# Patient Record
Sex: FEMALE | Race: WHITE | NOT HISPANIC OR LATINO | URBAN - METROPOLITAN AREA
[De-identification: names, ages, dates, MRNs, and addresses within clinical notes are randomized per-mention and may not be internally consistent; named-entity substitution may affect disease eponyms.]

---

## 2020-07-25 ENCOUNTER — TELEPHONE ENCOUNTER (OUTPATIENT)
Dept: URBAN - METROPOLITAN AREA CLINIC 13 | Facility: CLINIC | Age: 30
End: 2020-07-25

## 2020-07-26 ENCOUNTER — TELEPHONE ENCOUNTER (OUTPATIENT)
Dept: URBAN - METROPOLITAN AREA CLINIC 13 | Facility: CLINIC | Age: 30
End: 2020-07-26

## 2020-07-26 RX ORDER — LINACLOTIDE 72 UG/1
CAPSULE, GELATIN COATED ORAL
Qty: 90 | Refills: 0 | Status: ACTIVE | COMMUNITY
Start: 2019-08-07

## 2020-07-26 RX ORDER — BROMPHENIRAMINE MALEATE, PSEUDOEPHEDRINE HYDROCHLORIDE, 2; 30; 10 MG/5ML; MG/5ML; MG/5ML
TAKE 5 MLS BY MOUTH EVERY 4-6 HOURS SYRUP ORAL
Qty: 210 | Refills: 0 | Status: ACTIVE | COMMUNITY
Start: 2020-01-16

## 2020-07-26 RX ORDER — FLUTICASONE PROPIONATE 50 UG/1
INSTILL 2 SPRAYS IN EACH NOSTRIL DAILY SPRAY, METERED NASAL
Qty: 16 | Refills: 0 | Status: ACTIVE | COMMUNITY
Start: 2020-01-16

## 2020-07-26 RX ORDER — TIZANIDINE 2 MG/1
TAKE ONE TABLET BY MOUTH TWICE DAILY AS NEEDED FOR HEADACHE TABLET ORAL
Qty: 28 | Refills: 0 | Status: ACTIVE | COMMUNITY
Start: 2019-08-29

## 2020-07-26 RX ORDER — DICYCLOMINE HYDROCHLORIDE 20 MG/1
TABLET ORAL
Qty: 42 | Refills: 0 | Status: ACTIVE | COMMUNITY
Start: 2019-10-21

## 2020-07-26 RX ORDER — PROMETHAZINE HYDROCHLORIDE AND CODEINE PHOSPHATE 6.25; 1 MG/5ML; MG/5ML
SOLUTION ORAL
Qty: 70 | Refills: 0 | Status: ACTIVE | COMMUNITY
Start: 2019-02-14

## 2020-07-26 RX ORDER — NEOMYCIN SULFATE, POLYMYXIN B SULFATE AND DEXAMETHASONE 3.5; 10000; 1 MG/ML; [USP'U]/ML; MG/ML
SUSPENSION/ DROPS OPHTHALMIC
Qty: 5 | Refills: 0 | Status: ACTIVE | COMMUNITY
Start: 2019-10-14

## 2020-07-26 RX ORDER — CEPHALEXIN 500 MG/1
CAPSULE ORAL
Qty: 14 | Refills: 0 | Status: ACTIVE | COMMUNITY
Start: 2019-10-21

## 2020-07-26 RX ORDER — LINACLOTIDE 72 UG/1
TAKE ONE CAPSULE BY MOUTH DAILY CAPSULE, GELATIN COATED ORAL
Qty: 90 | Refills: 0 | Status: ACTIVE | COMMUNITY
Start: 2019-07-23

## 2020-07-26 RX ORDER — AZITHROMYCIN DIHYDRATE 250 MG/1
TABLET, FILM COATED ORAL
Qty: 6 | Refills: 0 | Status: ACTIVE | COMMUNITY
Start: 2019-02-14

## 2020-07-26 RX ORDER — AZITHROMYCIN DIHYDRATE 250 MG/1
TAKE 2 TABLETS BY MOUTH TODAY, THEN TAKE 1 TABLET DAILY FOR 4 DAYS TABLET, FILM COATED ORAL
Qty: 6 | Refills: 0 | Status: ACTIVE | COMMUNITY
Start: 2020-01-16

## 2020-07-26 RX ORDER — DOCUSATE SODIUM 100 MG/1
TAKE ONE CAPSULE BY MOUTH ONCE DAILY AS NEEDED CAPSULE, LIQUID FILLED ORAL
Qty: 30 | Refills: 0 | Status: ACTIVE | COMMUNITY
Start: 2020-03-05

## 2020-07-26 RX ORDER — CHLORHEXIDINE GLUCONATE 4 %
TAKE ONE TABLET BY MOUTH ONCE DAILY LIQUID (ML) TOPICAL
Qty: 30 | Refills: 0 | Status: ACTIVE | COMMUNITY
Start: 2020-03-05

## 2020-07-26 RX ORDER — CLONIDINE HYDROCHLORIDE 0.1 MG/1
TAKE 1 TABLET AT BEDTIME TABLET ORAL
Refills: 0 | Status: ACTIVE | COMMUNITY
Start: 2019-01-26

## 2020-07-26 RX ORDER — IBUPROFEN 600 MG/1
TAKE 1 TABLET BY MOUTH 3 TIMES DAILY TABLET ORAL
Qty: 15 | Refills: 0 | Status: ACTIVE | COMMUNITY
Start: 2019-02-14

## 2020-07-26 RX ORDER — IBUPROFEN 600 MG/1
TAKE 1 TABLET BY MOUTH 3 TIMES DAILY TABLET ORAL
Qty: 15 | Refills: 0 | Status: ACTIVE | COMMUNITY
Start: 2020-01-16

## 2020-07-26 RX ORDER — NEOMYCIN SULFATE, POLYMYXIN B SULFATE AND DEXAMETHASONE 3.5; 10000; 1 MG/ML; [USP'U]/ML; MG/ML
INSTILL 3 DROPS IN THE NOSE 3 TIMES DAILY SUSPENSION/ DROPS OPHTHALMIC
Qty: 5 | Refills: 0 | Status: ACTIVE | COMMUNITY
Start: 2019-02-14

## 2020-07-26 RX ORDER — CHLORDIAZEPOXIDE HYDROCHLORIDE AND CLIDINIUM BROMIDE 5; 2.5 MG/1; MG/1
TAKE 1 CAPSULE 3 TIMES DAILY PRN PAIN CAPSULE ORAL
Qty: 45 | Refills: 1 | Status: ACTIVE | COMMUNITY
Start: 2019-12-30

## 2023-01-12 ENCOUNTER — OFFICE VISIT (OUTPATIENT)
Dept: URBAN - METROPOLITAN AREA CLINIC 113 | Facility: CLINIC | Age: 33
End: 2023-01-12

## 2023-01-23 ENCOUNTER — DASHBOARD ENCOUNTERS (OUTPATIENT)
Age: 33
End: 2023-01-23

## 2023-01-23 ENCOUNTER — OFFICE VISIT (OUTPATIENT)
Dept: URBAN - METROPOLITAN AREA CLINIC 113 | Facility: CLINIC | Age: 33
End: 2023-01-23
Payer: COMMERCIAL

## 2023-01-23 VITALS
TEMPERATURE: 97.6 F | WEIGHT: 150 LBS | HEART RATE: 99 BPM | RESPIRATION RATE: 18 BRPM | DIASTOLIC BLOOD PRESSURE: 75 MMHG | SYSTOLIC BLOOD PRESSURE: 115 MMHG | BODY MASS INDEX: 26.58 KG/M2 | HEIGHT: 63 IN

## 2023-01-23 DIAGNOSIS — R10.30 LOWER ABDOMINAL PAIN: ICD-10-CM

## 2023-01-23 DIAGNOSIS — K92.89 GAS BLOAT SYNDROME: ICD-10-CM

## 2023-01-23 PROCEDURE — 99214 OFFICE O/P EST MOD 30 MIN: CPT | Performed by: INTERNAL MEDICINE

## 2023-01-23 RX ORDER — POLYETHYLENE GLYCOL 3350 17 G/17G
AS DIRECTED POWDER, FOR SOLUTION ORAL
Status: ACTIVE | COMMUNITY

## 2023-01-23 RX ORDER — CLONIDINE HYDROCHLORIDE 0.1 MG/1
TAKE 1 TABLET AT BEDTIME TABLET ORAL
Refills: 0 | Status: ACTIVE | COMMUNITY
Start: 2019-01-26

## 2023-01-23 RX ORDER — TIZANIDINE 2 MG/1
TAKE ONE TABLET BY MOUTH TWICE DAILY AS NEEDED FOR HEADACHE TABLET ORAL
Qty: 28 | Refills: 0 | Status: ON HOLD | COMMUNITY
Start: 2019-08-29

## 2023-01-23 RX ORDER — LINACLOTIDE 72 UG/1
TAKE ONE CAPSULE BY MOUTH DAILY CAPSULE, GELATIN COATED ORAL
Qty: 90 | Refills: 0 | Status: ON HOLD | COMMUNITY
Start: 2019-07-23

## 2023-01-23 RX ORDER — DICYCLOMINE HYDROCHLORIDE 20 MG/1
TABLET ORAL
Qty: 42 | Refills: 0 | Status: ON HOLD | COMMUNITY
Start: 2019-10-21

## 2023-01-23 RX ORDER — PROMETHAZINE HYDROCHLORIDE AND CODEINE PHOSPHATE 6.25; 1 MG/5ML; MG/5ML
SOLUTION ORAL
Qty: 70 | Refills: 0 | Status: ON HOLD | COMMUNITY
Start: 2019-02-14

## 2023-01-23 RX ORDER — FLUTICASONE PROPIONATE 50 UG/1
INSTILL 2 SPRAYS IN EACH NOSTRIL DAILY SPRAY, METERED NASAL
Qty: 16 | Refills: 0 | Status: ON HOLD | COMMUNITY
Start: 2020-01-16

## 2023-01-23 RX ORDER — HYOSCYAMINE SULFATE 0.12 MG/1
1 TABLET UNDER THE TONGUE AND ALLOW TO DISSOLVE  AS NEEDED TABLET, ORALLY DISINTEGRATING ORAL
Qty: 60 | Refills: 3 | OUTPATIENT
Start: 2023-01-23 | End: 2023-05-23

## 2023-01-23 RX ORDER — NEOMYCIN SULFATE, POLYMYXIN B SULFATE AND DEXAMETHASONE 3.5; 10000; 1 MG/ML; [USP'U]/ML; MG/ML
INSTILL 3 DROPS IN THE NOSE 3 TIMES DAILY SUSPENSION/ DROPS OPHTHALMIC
Qty: 5 | Refills: 0 | Status: ON HOLD | COMMUNITY
Start: 2019-02-14

## 2023-01-23 RX ORDER — IBUPROFEN 600 MG/1
TAKE 1 TABLET BY MOUTH 3 TIMES DAILY TABLET ORAL
Qty: 15 | Refills: 0 | Status: ON HOLD | COMMUNITY
Start: 2019-02-14

## 2023-01-23 RX ORDER — CHLORDIAZEPOXIDE HYDROCHLORIDE AND CLIDINIUM BROMIDE 5; 2.5 MG/1; MG/1
TAKE 1 CAPSULE 3 TIMES DAILY PRN PAIN CAPSULE ORAL
Qty: 45 | Refills: 1 | Status: ON HOLD | COMMUNITY
Start: 2019-12-30

## 2023-01-23 RX ORDER — CEPHALEXIN 500 MG/1
CAPSULE ORAL
Qty: 14 | Refills: 0 | Status: ON HOLD | COMMUNITY
Start: 2019-10-21

## 2023-01-23 RX ORDER — AZITHROMYCIN DIHYDRATE 250 MG/1
TABLET, FILM COATED ORAL
Qty: 6 | Refills: 0 | Status: ON HOLD | COMMUNITY
Start: 2019-02-14

## 2023-01-23 RX ORDER — BROMPHENIRAMINE MALEATE, PSEUDOEPHEDRINE HYDROCHLORIDE, 2; 30; 10 MG/5ML; MG/5ML; MG/5ML
TAKE 5 MLS BY MOUTH EVERY 4-6 HOURS SYRUP ORAL
Qty: 210 | Refills: 0 | Status: ON HOLD | COMMUNITY
Start: 2020-01-16

## 2023-01-23 RX ORDER — DOCUSATE SODIUM 100 MG/1
TAKE ONE CAPSULE BY MOUTH ONCE DAILY AS NEEDED CAPSULE, LIQUID FILLED ORAL
Qty: 30 | Refills: 0 | Status: ON HOLD | COMMUNITY
Start: 2020-03-05

## 2023-01-23 RX ORDER — CHLORHEXIDINE GLUCONATE 4 %
TAKE ONE TABLET BY MOUTH ONCE DAILY LIQUID (ML) TOPICAL
Qty: 30 | Refills: 0 | Status: ACTIVE | COMMUNITY
Start: 2020-03-05

## 2023-01-23 NOTE — HPI-TODAY'S VISIT:
Patient returns today in follow-up.  She has not been seen in some time.  She has significant complaints today of abdominal cramping, gas bloating and flatulence.  This occurs right after she eats no matter what she eats.  She did go on a more gluten-free diet.  This has helped to some extent but she continues to have symptoms.  She previously tried Bentyl without benefit.  She is just starting a probiotic.  She has 1 bowel movement per day which is always loose.  She takes MiraLAX.  She denies any fevers or blood in the stool.  No unusual weight loss or family history of inflammatory bowel disease.

## 2023-03-23 ENCOUNTER — OFFICE VISIT (OUTPATIENT)
Dept: URBAN - METROPOLITAN AREA CLINIC 113 | Facility: CLINIC | Age: 33
End: 2023-03-23

## 2025-04-27 ENCOUNTER — TELEPHONE ENCOUNTER (OUTPATIENT)
Dept: URBAN - METROPOLITAN AREA CLINIC 113 | Facility: CLINIC | Age: 35
End: 2025-04-27

## 2025-04-30 ENCOUNTER — TELEPHONE ENCOUNTER (OUTPATIENT)
Dept: URBAN - METROPOLITAN AREA CLINIC 113 | Facility: CLINIC | Age: 35
End: 2025-04-30

## 2025-05-22 PROBLEM — 440630006: Status: ACTIVE | Noted: 2025-05-22

## 2025-05-23 ENCOUNTER — OFFICE VISIT (OUTPATIENT)
Dept: URBAN - METROPOLITAN AREA CLINIC 113 | Facility: CLINIC | Age: 35
End: 2025-05-23
Payer: COMMERCIAL

## 2025-05-23 DIAGNOSIS — K58.1 IRRITABLE BOWEL SYNDROME WITH CONSTIPATION: ICD-10-CM

## 2025-05-23 DIAGNOSIS — R14.0 BLOATING: ICD-10-CM

## 2025-05-23 DIAGNOSIS — K59.09 CHRONIC CONSTIPATION: ICD-10-CM

## 2025-05-23 PROCEDURE — 99214 OFFICE O/P EST MOD 30 MIN: CPT | Performed by: INTERNAL MEDICINE

## 2025-05-23 RX ORDER — CLONIDINE HYDROCHLORIDE 0.1 MG/1
TAKE 1 TABLET AT BEDTIME TABLET ORAL
Refills: 0 | Status: ACTIVE | COMMUNITY
Start: 2019-01-26

## 2025-05-23 RX ORDER — FLUTICASONE PROPIONATE 50 UG/1
INSTILL 2 SPRAYS IN EACH NOSTRIL DAILY SPRAY, METERED NASAL
Qty: 16 | Refills: 0 | Status: ON HOLD | COMMUNITY
Start: 2020-01-16

## 2025-05-23 RX ORDER — CHLORHEXIDINE GLUCONATE 4 %
TAKE ONE TABLET BY MOUTH ONCE DAILY LIQUID (ML) TOPICAL
Qty: 30 | Refills: 0 | Status: ACTIVE | COMMUNITY
Start: 2020-03-05

## 2025-05-23 RX ORDER — PROMETHAZINE HYDROCHLORIDE AND CODEINE PHOSPHATE 6.25; 1 MG/5ML; MG/5ML
SOLUTION ORAL
Qty: 70 | Refills: 0 | Status: ON HOLD | COMMUNITY
Start: 2019-02-14

## 2025-05-23 RX ORDER — DICYCLOMINE HYDROCHLORIDE 20 MG/1
TABLET ORAL
Qty: 42 | Refills: 0 | Status: ON HOLD | COMMUNITY
Start: 2019-10-21

## 2025-05-23 RX ORDER — LINACLOTIDE 72 UG/1
TAKE ONE CAPSULE BY MOUTH DAILY CAPSULE, GELATIN COATED ORAL
Qty: 90 | Refills: 0 | Status: ON HOLD | COMMUNITY
Start: 2019-07-23

## 2025-05-23 RX ORDER — TIZANIDINE 2 MG/1
TAKE ONE TABLET BY MOUTH TWICE DAILY AS NEEDED FOR HEADACHE TABLET ORAL
Qty: 28 | Refills: 0 | Status: ON HOLD | COMMUNITY
Start: 2019-08-29

## 2025-05-23 RX ORDER — CEPHALEXIN 500 MG/1
CAPSULE ORAL
Qty: 14 | Refills: 0 | Status: ON HOLD | COMMUNITY
Start: 2019-10-21

## 2025-05-23 RX ORDER — AZITHROMYCIN DIHYDRATE 250 MG/1
TABLET, FILM COATED ORAL
Qty: 6 | Refills: 0 | Status: ON HOLD | COMMUNITY
Start: 2019-02-14

## 2025-05-23 RX ORDER — DOCUSATE SODIUM 100 MG/1
TAKE ONE CAPSULE BY MOUTH ONCE DAILY AS NEEDED CAPSULE, LIQUID FILLED ORAL
Qty: 30 | Refills: 0 | Status: ON HOLD | COMMUNITY
Start: 2020-03-05

## 2025-05-23 RX ORDER — NEOMYCIN SULFATE, POLYMYXIN B SULFATE AND DEXAMETHASONE 3.5; 10000; 1 MG/ML; [USP'U]/ML; MG/ML
INSTILL 3 DROPS IN THE NOSE 3 TIMES DAILY SUSPENSION/ DROPS OPHTHALMIC
Qty: 5 | Refills: 0 | Status: ON HOLD | COMMUNITY
Start: 2019-02-14

## 2025-05-23 RX ORDER — POLYETHYLENE GLYCOL 3350 17 G/DOSE
AS DIRECTED POWDER (GRAM) ORAL
Status: ACTIVE | COMMUNITY

## 2025-05-23 RX ORDER — BROMPHENIRAMINE MALEATE, PSEUDOEPHEDRINE HYDROCHLORIDE, 2; 30; 10 MG/5ML; MG/5ML; MG/5ML
TAKE 5 MLS BY MOUTH EVERY 4-6 HOURS SYRUP ORAL
Qty: 210 | Refills: 0 | Status: ON HOLD | COMMUNITY
Start: 2020-01-16

## 2025-05-23 RX ORDER — DICYCLOMINE HYDROCHLORIDE 10 MG/1
1 TABLET CAPSULE ORAL
Qty: 40 | Refills: 1 | OUTPATIENT
Start: 2025-05-23

## 2025-05-23 RX ORDER — CHLORDIAZEPOXIDE HYDROCHLORIDE AND CLIDINIUM BROMIDE 5; 2.5 MG/1; MG/1
TAKE 1 CAPSULE 3 TIMES DAILY PRN PAIN CAPSULE ORAL
Qty: 45 | Refills: 1 | Status: ON HOLD | COMMUNITY
Start: 2019-12-30

## 2025-05-23 RX ORDER — IBUPROFEN 600 MG/1
TAKE 1 TABLET BY MOUTH 3 TIMES DAILY TABLET ORAL
Qty: 15 | Refills: 0 | Status: ON HOLD | COMMUNITY
Start: 2019-02-14

## 2025-08-21 ENCOUNTER — OFFICE VISIT (OUTPATIENT)
Dept: URBAN - METROPOLITAN AREA CLINIC 113 | Facility: CLINIC | Age: 35
End: 2025-08-21

## 2025-08-21 DIAGNOSIS — K59.09 CHRONIC CONSTIPATION: ICD-10-CM

## 2025-08-21 DIAGNOSIS — K58.1 IRRITABLE BOWEL SYNDROME WITH CONSTIPATION: ICD-10-CM

## 2025-08-21 DIAGNOSIS — R14.0 BLOATING: ICD-10-CM

## 2025-08-21 PROCEDURE — 99213 OFFICE O/P EST LOW 20 MIN: CPT | Performed by: INTERNAL MEDICINE

## 2025-08-21 RX ORDER — TIZANIDINE 2 MG/1
TAKE ONE TABLET BY MOUTH TWICE DAILY AS NEEDED FOR HEADACHE TABLET ORAL
Qty: 28 | Refills: 0 | Status: ON HOLD | COMMUNITY
Start: 2019-08-29

## 2025-08-21 RX ORDER — PROMETHAZINE HYDROCHLORIDE AND CODEINE PHOSPHATE 6.25; 1 MG/5ML; MG/5ML
SOLUTION ORAL
Qty: 70 | Refills: 0 | Status: ON HOLD | COMMUNITY
Start: 2019-02-14

## 2025-08-21 RX ORDER — FLUTICASONE PROPIONATE 50 UG/1
INSTILL 2 SPRAYS IN EACH NOSTRIL DAILY SPRAY, METERED NASAL
Qty: 16 | Refills: 0 | Status: ON HOLD | COMMUNITY
Start: 2020-01-16

## 2025-08-21 RX ORDER — DICYCLOMINE HYDROCHLORIDE 20 MG/1
TABLET ORAL
Qty: 42 | Refills: 0 | Status: ON HOLD | COMMUNITY
Start: 2019-10-21

## 2025-08-21 RX ORDER — NEOMYCIN SULFATE, POLYMYXIN B SULFATE AND DEXAMETHASONE 3.5; 10000; 1 MG/ML; [USP'U]/ML; MG/ML
INSTILL 3 DROPS IN THE NOSE 3 TIMES DAILY SUSPENSION/ DROPS OPHTHALMIC
Qty: 5 | Refills: 0 | Status: ON HOLD | COMMUNITY
Start: 2019-02-14

## 2025-08-21 RX ORDER — BROMPHENIRAMINE MALEATE, PSEUDOEPHEDRINE HYDROCHLORIDE, 2; 30; 10 MG/5ML; MG/5ML; MG/5ML
TAKE 5 MLS BY MOUTH EVERY 4-6 HOURS SYRUP ORAL
Qty: 210 | Refills: 0 | Status: ON HOLD | COMMUNITY
Start: 2020-01-16

## 2025-08-21 RX ORDER — POLYETHYLENE GLYCOL 3350 17 G/DOSE
AS DIRECTED POWDER (GRAM) ORAL
Status: ACTIVE | COMMUNITY

## 2025-08-21 RX ORDER — AZITHROMYCIN DIHYDRATE 250 MG/1
TABLET, FILM COATED ORAL
Qty: 6 | Refills: 0 | Status: ON HOLD | COMMUNITY
Start: 2019-02-14

## 2025-08-21 RX ORDER — CHLORHEXIDINE GLUCONATE 4 %
TAKE ONE TABLET BY MOUTH ONCE DAILY LIQUID (ML) TOPICAL
Qty: 30 | Refills: 0 | Status: ACTIVE | COMMUNITY
Start: 2020-03-05

## 2025-08-21 RX ORDER — TENAPANOR HYDROCHLORIDE 53.2 MG/1
1 TABLET IMMEDIATELY BEFORE MEALS TABLET ORAL TWICE A DAY
Qty: 180 TABLET | Refills: 3 | OUTPATIENT
Start: 2025-08-21

## 2025-08-21 RX ORDER — DICYCLOMINE HYDROCHLORIDE 10 MG/1
1 TABLET CAPSULE ORAL
Qty: 40 | Refills: 1 | Status: ACTIVE | COMMUNITY
Start: 2025-05-23

## 2025-08-21 RX ORDER — IBUPROFEN 600 MG/1
TAKE 1 TABLET BY MOUTH 3 TIMES DAILY TABLET ORAL
Qty: 15 | Refills: 0 | Status: ON HOLD | COMMUNITY
Start: 2019-02-14

## 2025-08-21 RX ORDER — CLONIDINE HYDROCHLORIDE 0.1 MG/1
TAKE 1 TABLET AT BEDTIME TABLET ORAL
Refills: 0 | Status: ACTIVE | COMMUNITY
Start: 2019-01-26

## 2025-08-21 RX ORDER — CEPHALEXIN 500 MG/1
CAPSULE ORAL
Qty: 14 | Refills: 0 | Status: ON HOLD | COMMUNITY
Start: 2019-10-21

## 2025-08-21 RX ORDER — DOCUSATE SODIUM 100 MG/1
TAKE ONE CAPSULE BY MOUTH ONCE DAILY AS NEEDED CAPSULE, LIQUID FILLED ORAL
Qty: 30 | Refills: 0 | Status: ON HOLD | COMMUNITY
Start: 2020-03-05

## 2025-08-21 RX ORDER — CLONAZEPAM 1 MG/1
1 TABLET TABLET ORAL ONCE A DAY
Status: ACTIVE | COMMUNITY

## 2025-08-21 RX ORDER — CHLORDIAZEPOXIDE HYDROCHLORIDE AND CLIDINIUM BROMIDE 5; 2.5 MG/1; MG/1
TAKE 1 CAPSULE 3 TIMES DAILY PRN PAIN CAPSULE ORAL
Qty: 45 | Refills: 1 | Status: ON HOLD | COMMUNITY
Start: 2019-12-30

## 2025-08-21 RX ORDER — LINACLOTIDE 72 UG/1
TAKE ONE CAPSULE BY MOUTH DAILY CAPSULE, GELATIN COATED ORAL
Qty: 90 | Refills: 0 | Status: ON HOLD | COMMUNITY
Start: 2019-07-23

## 2025-08-22 ENCOUNTER — TELEPHONE ENCOUNTER (OUTPATIENT)
Dept: URBAN - METROPOLITAN AREA CLINIC 113 | Facility: CLINIC | Age: 35
End: 2025-08-22

## 2025-08-26 ENCOUNTER — TELEPHONE ENCOUNTER (OUTPATIENT)
Dept: URBAN - METROPOLITAN AREA CLINIC 113 | Facility: CLINIC | Age: 35
End: 2025-08-26